# Patient Record
Sex: FEMALE | Race: ASIAN | NOT HISPANIC OR LATINO | URBAN - METROPOLITAN AREA
[De-identification: names, ages, dates, MRNs, and addresses within clinical notes are randomized per-mention and may not be internally consistent; named-entity substitution may affect disease eponyms.]

---

## 2024-03-26 ENCOUNTER — OUTPATIENT (OUTPATIENT)
Dept: OUTPATIENT SERVICES | Facility: HOSPITAL | Age: 19
LOS: 1 days | End: 2024-03-26
Payer: COMMERCIAL

## 2024-03-26 ENCOUNTER — NON-APPOINTMENT (OUTPATIENT)
Age: 19
End: 2024-03-26

## 2024-03-26 ENCOUNTER — APPOINTMENT (OUTPATIENT)
Dept: OBGYN | Facility: CLINIC | Age: 19
End: 2024-03-26

## 2024-03-26 DIAGNOSIS — Z32.00 ENCOUNTER FOR PREGNANCY TEST, RESULT UNKNOWN: ICD-10-CM

## 2024-03-26 PROCEDURE — 81025 URINE PREGNANCY TEST: CPT

## 2024-03-27 ENCOUNTER — NON-APPOINTMENT (OUTPATIENT)
Age: 19
End: 2024-03-27

## 2024-03-29 ENCOUNTER — NON-APPOINTMENT (OUTPATIENT)
Age: 19
End: 2024-03-29

## 2024-03-29 DIAGNOSIS — Z32.01 ENCOUNTER FOR PREGNANCY TEST, RESULT POSITIVE: ICD-10-CM

## 2024-04-01 ENCOUNTER — LABORATORY RESULT (OUTPATIENT)
Age: 19
End: 2024-04-01

## 2024-04-01 ENCOUNTER — OUTPATIENT (OUTPATIENT)
Dept: OUTPATIENT SERVICES | Facility: HOSPITAL | Age: 19
LOS: 1 days | End: 2024-04-01
Payer: COMMERCIAL

## 2024-04-01 ENCOUNTER — APPOINTMENT (OUTPATIENT)
Dept: OBGYN | Facility: CLINIC | Age: 19
End: 2024-04-01
Payer: COMMERCIAL

## 2024-04-01 VITALS
HEIGHT: 61 IN | BODY MASS INDEX: 19.15 KG/M2 | OXYGEN SATURATION: 100 % | WEIGHT: 101.44 LBS | HEART RATE: 76 BPM | DIASTOLIC BLOOD PRESSURE: 68 MMHG | SYSTOLIC BLOOD PRESSURE: 106 MMHG

## 2024-04-01 DIAGNOSIS — Z64.0 PROBLEMS RELATED TO UNWANTED PREGNANCY: ICD-10-CM

## 2024-04-01 DIAGNOSIS — Z00.00 ENCOUNTER FOR GENERAL ADULT MEDICAL EXAMINATION W/OUT ABNORMAL FINDINGS: ICD-10-CM

## 2024-04-01 LAB
ABO + RH PNL BLD: NORMAL
BLD GP AB SCN SERPL QL: NORMAL
HCT VFR BLD CALC: 37 %
HGB BLD-MCNC: 12.5 G/DL
MCHC RBC-ENTMCNC: 29.9 PG
MCHC RBC-ENTMCNC: 33.8 G/DL
MCV RBC AUTO: 88.5 FL
PLATELET # BLD AUTO: 276 K/UL
PMV BLD AUTO: 0 /100 WBCS
PMV BLD: 11.2 FL
RBC # BLD: 4.18 M/UL
RBC # FLD: 13.3 %
WBC # FLD AUTO: 9.52 K/UL

## 2024-04-01 PROCEDURE — 76815 OB US LIMITED FETUS(S): CPT

## 2024-04-01 PROCEDURE — 87389 HIV-1 AG W/HIV-1&-2 AB AG IA: CPT

## 2024-04-01 PROCEDURE — 87591 N.GONORRHOEAE DNA AMP PROB: CPT

## 2024-04-01 PROCEDURE — 36415 COLL VENOUS BLD VENIPUNCTURE: CPT

## 2024-04-01 PROCEDURE — 99214 OFFICE O/P EST MOD 30 MIN: CPT | Mod: GC

## 2024-04-01 PROCEDURE — 76801 OB US < 14 WKS SINGLE FETUS: CPT | Mod: 26

## 2024-04-01 PROCEDURE — 87661 TRICHOMONAS VAGINALIS AMPLIF: CPT

## 2024-04-01 PROCEDURE — 86780 TREPONEMA PALLIDUM: CPT

## 2024-04-01 PROCEDURE — 85027 COMPLETE CBC AUTOMATED: CPT

## 2024-04-01 PROCEDURE — 87491 CHLMYD TRACH DNA AMP PROBE: CPT

## 2024-04-01 PROCEDURE — 86701 HIV-1ANTIBODY: CPT

## 2024-04-01 PROCEDURE — 87340 HEPATITIS B SURFACE AG IA: CPT

## 2024-04-01 PROCEDURE — 76815 OB US LIMITED FETUS(S): CPT | Mod: 26

## 2024-04-01 PROCEDURE — 99215 OFFICE O/P EST HI 40 MIN: CPT

## 2024-04-01 PROCEDURE — 86900 BLOOD TYPING SEROLOGIC ABO: CPT

## 2024-04-01 PROCEDURE — 86850 RBC ANTIBODY SCREEN: CPT

## 2024-04-01 PROCEDURE — 86803 HEPATITIS C AB TEST: CPT

## 2024-04-01 NOTE — PHYSICAL EXAM
[Appropriately responsive] : appropriately responsive [No Acute Distress] : no acute distress [Alert] : alert [Non-tender] : non-tender [Soft] : soft [Oriented x3] : oriented x3 [Non-distended] : non-distended

## 2024-04-02 LAB
C TRACH RRNA SPEC QL NAA+PROBE: NOT DETECTED
HBV SURFACE AG SER QL: NONREACTIVE
HCV AB SER QL: NONREACTIVE
HCV S/CO RATIO: 0.08 S/CO
N GONORRHOEA RRNA SPEC QL NAA+PROBE: NOT DETECTED
SOURCE AMPLIFICATION: NORMAL
SOURCE AMPLIFICATION: NORMAL
T PALLIDUM AB SER QL IA: NEGATIVE
T VAGINALIS RRNA SPEC QL NAA+PROBE: NOT DETECTED

## 2024-04-04 NOTE — H&P PST ADULT - HISTORY OF PRESENT ILLNESS
19yo  @ 16+3w presenting today for scheduled D&E for termination of pregnancy. TAUS on  showed Biometry c/w 15+6, with cardiac activity, anterior placenta, MAHENDRA subjectively normal. No significant medical or surgical hx.     Labs: h/h 12.5/37, O pos

## 2024-04-04 NOTE — H&P PST ADULT - ATTENDING COMMENTS
Patient did not take Doxycycline, will give 200mg Doxycycline pre operatively. Patient tolerated Mifepristone yesterday without issue. Denies HA, CP, SOB, F/C, N/V. Affirmed she if firm in her decision to proceed, she took 600mcg Misoprostol pre operatively Buccally.  T&S sent today.

## 2024-04-04 NOTE — H&P PST ADULT - ASSESSMENT
19yo  @ 16+3w presenting for scheduled D&E for termination of pregnancy.  - Labs (): h/h 12.5/37, O pos. Will order active T&S.  - Cervical prep: mifepristone 200mg day prior & misoprostol 600mg 1.5hrs preoperatively.  - Abx: Doxycyline 200mg PO preop and doxycyline 100mg PO postop in PACU  - Ibuprofen 800mg PO x8hrs for pain   - NPO/IVF  - consent signed (in chart)  - on call to OR 19yo  @ 16+3w presenting for scheduled D&E for termination of pregnancy.  - Labs (): h/h 12.5/37, O pos. Will order active T&S.  - Cervical prep: mifepristone 200mg day prior & misoprostol 600mg 1.5hrs preoperatively.  - Abx: Doxycyline 200mg PO preop and doxycyline 100mg PO postop in PACU  - Ibuprofen 800mg PO x8hrs for pain   - NPO/IVF  - on call to OR

## 2024-04-05 ENCOUNTER — TRANSCRIPTION ENCOUNTER (OUTPATIENT)
Age: 19
End: 2024-04-05

## 2024-04-05 ENCOUNTER — RESULT REVIEW (OUTPATIENT)
Age: 19
End: 2024-04-05

## 2024-04-05 ENCOUNTER — OUTPATIENT (OUTPATIENT)
Dept: OUTPATIENT SERVICES | Facility: HOSPITAL | Age: 19
LOS: 1 days | Discharge: HOME | End: 2024-04-05
Payer: MEDICAID

## 2024-04-05 VITALS
WEIGHT: 100.97 LBS | DIASTOLIC BLOOD PRESSURE: 49 MMHG | OXYGEN SATURATION: 98 % | HEART RATE: 95 BPM | RESPIRATION RATE: 20 BRPM | TEMPERATURE: 99 F | SYSTOLIC BLOOD PRESSURE: 100 MMHG | HEIGHT: 61 IN

## 2024-04-05 VITALS
HEART RATE: 79 BPM | RESPIRATION RATE: 17 BRPM | OXYGEN SATURATION: 100 % | SYSTOLIC BLOOD PRESSURE: 108 MMHG | DIASTOLIC BLOOD PRESSURE: 65 MMHG

## 2024-04-05 DIAGNOSIS — Z64.0 PROBLEMS RELATED TO UNWANTED PREGNANCY: ICD-10-CM

## 2024-04-05 LAB — BLD GP AB SCN SERPL QL: SIGNIFICANT CHANGE UP

## 2024-04-05 PROCEDURE — 88304 TISSUE EXAM BY PATHOLOGIST: CPT

## 2024-04-05 PROCEDURE — 88304 TISSUE EXAM BY PATHOLOGIST: CPT | Mod: 26

## 2024-04-05 PROCEDURE — 86850 RBC ANTIBODY SCREEN: CPT

## 2024-04-05 PROCEDURE — 86900 BLOOD TYPING SEROLOGIC ABO: CPT

## 2024-04-05 PROCEDURE — 86901 BLOOD TYPING SEROLOGIC RH(D): CPT

## 2024-04-05 PROCEDURE — 36415 COLL VENOUS BLD VENIPUNCTURE: CPT

## 2024-04-05 PROCEDURE — C9399: CPT

## 2024-04-05 PROCEDURE — 59841 INDUCED ABORTION DILAT&EVAC: CPT

## 2024-04-05 RX ORDER — ONDANSETRON 8 MG/1
4 TABLET, FILM COATED ORAL ONCE
Refills: 0 | Status: DISCONTINUED | OUTPATIENT
Start: 2024-04-05 | End: 2024-04-05

## 2024-04-05 RX ORDER — SODIUM CHLORIDE 9 MG/ML
1000 INJECTION, SOLUTION INTRAVENOUS
Refills: 0 | Status: DISCONTINUED | OUTPATIENT
Start: 2024-04-05 | End: 2024-04-05

## 2024-04-05 RX ORDER — HYDROMORPHONE HYDROCHLORIDE 2 MG/ML
0.25 INJECTION INTRAMUSCULAR; INTRAVENOUS; SUBCUTANEOUS
Refills: 0 | Status: DISCONTINUED | OUTPATIENT
Start: 2024-04-05 | End: 2024-04-05

## 2024-04-05 RX ADMIN — SODIUM CHLORIDE 100 MILLILITER(S): 9 INJECTION, SOLUTION INTRAVENOUS at 10:50

## 2024-04-05 NOTE — ASU DISCHARGE PLAN (ADULT/PEDIATRIC) - ASU DC SPECIAL INSTRUCTIONSFT
NOTHING IN VAGINA FOR 2 WEEKS    PAIN MANAGEMENT:   Alternate Acetaminophen/Tylenol and Ibuprofen/Motrin (if you are eligible). Each of these medications can be taken every six hours. Try to stagger them so that you are taking something for pain every three hours (ex. Take Motrin at 12:00, Tylenol at 3:00, Motrin at 6:00, etc.) to maximize pain relief.  o Dosages       - Tylenol – 500-650 mg every 6 hours as needed       - Motrin/Ibuprofen - 600 mg every 6 hours as needed  o The maximum dose of Tylenol is 3000 mg in 24 hours, the maximum dose of Motrin/ibuprofen is 2400mg in 24 hours  A warm shower or heating pad may also help.    WHAT TO EXPECT AT HOME  -  Do not put anything in the vagina for at least 2 weeks after surgery unless otherwise instructed by your doctor (including tampons, douching, sexual intercourse, etc).  -  No driving for 1 week after surgery and not while taking narcotic pain medication. Drive defensively when you are ready.  - It is normal to have some vaginal bleeding after surgery that would require the use of a pantiliner.     WHEN TO CALL YOUR DOCTOR:  - Fever (>100.4°F or 38.0°C) or chills  - Severe nausea or persistent vomiting.  - Bright red vaginal bleeding (soaking >1 pad/hour) or foul smelling vaginal drainage.  - Severe pain not relieved with pain medication.  - Pain with urination, cloudy urine, or foul smelling urine.  - Or if you have any other problems or questions.

## 2024-04-05 NOTE — BRIEF OPERATIVE NOTE - NSICDXBRIEFPREOP_GEN_ALL_CORE_FT
PRE-OP DIAGNOSIS:  16 weeks gestation of pregnancy 05-Apr-2024 10:47:44  Natasha Aviles  Unwanted pregnancy 05-Apr-2024 10:47:40  Natasha Aviles

## 2024-04-05 NOTE — ASU DISCHARGE PLAN (ADULT/PEDIATRIC) - CARE PROVIDER_API CALL
Natasha Aviles  Obstetrics and Gynecology  [address] [place] [id number]  Phone: [phone]  Fax: [phone]  Follow Up Time: 2 weeks

## 2024-04-05 NOTE — BRIEF OPERATIVE NOTE - OPERATION/FINDINGS
Cervix small, medium consistency, closed   Dilated to size 63 clements dilator under ultrasound guidance   Size 16 cannula and sopher forceps used to evacuate uterine contents   Size 12 cannula with manual uterine aspiration performed until gritty texture noted throughout   Cervix inspected and 9 oclock noted to have 1cm cervical laceration, repaired with 2-0 Vicryl in running locked fashion with good hemostasis   Monsels solution applied to surface of cervix   Good hemostasis and good uterine tone post procedure

## 2024-04-05 NOTE — CHART NOTE - NSCHARTNOTEFT_GEN_A_CORE
PACU ANESTHESIA ADMISSION NOTE      Procedure: Induced  by dilation and evacuation      Post op diagnosis:  Unwanted pregnancy        ____  Intubated  TV:______       Rate: ______      FiO2: ______    _x___  Patent Airway    _x___  Full return of protective reflexes    __x__  Full recovery from anesthesia / back to baseline     Vitals:   T: 97.7          R:  13                BP: 90/69                  Sat:    100               P: 100      Mental Status:  ___x_ Awake   _____ Alert   _____ Drowsy   _____ Sedated    Nausea/Vomiting:  __x__ NO  ______Yes,   See Post - Op Orders          Pain Scale (0-10):  _____    Treatment: ____ None    _x___ See Post - Op/PCA Orders    Post - Operative Fluids:   ____ Oral   _x___ See Post - Op Orders    Plan: Discharge:   __x__Home       _____Floor     _____Critical Care    _____  Other:_________________    Comments:

## 2024-04-10 DIAGNOSIS — Z64.0 PROBLEMS RELATED TO UNWANTED PREGNANCY: ICD-10-CM

## 2024-04-10 DIAGNOSIS — O34.40 MATERNAL CARE FOR OTHER ABNORMALITIES OF CERVIX, UNSPECIFIED TRIMESTER: ICD-10-CM

## 2024-04-10 DIAGNOSIS — N88.1 OLD LACERATION OF CERVIX UTERI: ICD-10-CM

## 2024-04-11 LAB — SURGICAL PATHOLOGY STUDY: SIGNIFICANT CHANGE UP

## 2024-04-16 ENCOUNTER — APPOINTMENT (OUTPATIENT)
Dept: OBGYN | Facility: CLINIC | Age: 19
End: 2024-04-16
Payer: MEDICAID

## 2024-04-16 ENCOUNTER — OUTPATIENT (OUTPATIENT)
Dept: OUTPATIENT SERVICES | Facility: HOSPITAL | Age: 19
LOS: 1 days | End: 2024-04-16
Payer: MEDICAID

## 2024-04-16 VITALS
BODY MASS INDEX: 18.21 KG/M2 | SYSTOLIC BLOOD PRESSURE: 107 MMHG | HEART RATE: 70 BPM | OXYGEN SATURATION: 98 % | HEIGHT: 61 IN | WEIGHT: 96.44 LBS | DIASTOLIC BLOOD PRESSURE: 75 MMHG

## 2024-04-16 DIAGNOSIS — O03.9 COMPLETE OR UNSPECIFIED SPONTANEOUS ABORTION WITHOUT COMPLICATION: ICD-10-CM

## 2024-04-16 PROBLEM — Z78.9 OTHER SPECIFIED HEALTH STATUS: Chronic | Status: ACTIVE | Noted: 2024-04-04

## 2024-04-16 PROCEDURE — 99213 OFFICE O/P EST LOW 20 MIN: CPT

## 2024-04-16 RX ORDER — ULIPRISTAL ACETATE 30 MG/1
30 TABLET ORAL
Qty: 1 | Refills: 0 | Status: ACTIVE | COMMUNITY
Start: 2024-04-16 | End: 1900-01-01

## 2024-04-16 NOTE — PLAN
[FreeTextEntry1] : TAUS: FL: 1.88cm, BPD: 3.15cm, Biometry c/w 15+6, with cardiac activity, anterior placenta, MAHENDRA subjectively normal   A/P: 17yo   @ 15+6 presenting today for procedural management of undesired pregnancy.   Options Counseling: The patient was counseled about her pregnancy options of parenthood, adoption and . She expressed her firm decision for pregnancy termination.   Risks and benefits of the procedure were explained, including risk of bleeding, infection, uterine perforation and damage to nearby structures, cervical laceration, retained products of conception and need for re-aspiration.   All questions answered to the patient and partner's satisfaction.   T&S, CBC drawn today will give Rhogam if Rh Negative   STI screening offered and accepted: Self swab and serology sent today      Mifepristone and Misoprostol will be provided for cervical prep. Mifepristone dispensed today with instructions to take the day before procedure. Will administer 600mcg Misoprostol at FERNY day of procedure.   Doxycycline 200mg PO dispensed today with instructions for patient to take the night before procedure   We reviewed preop and postoperative instructions with her, including NPO instructions.   Contraception and Future Pregnancy Planning: Patient offered options counseling declines initiating a method at this time

## 2024-04-16 NOTE — HISTORY OF PRESENT ILLNESS
[FreeTextEntry1] : Patient presenting today for termination of pregnancy. Patient with irregular menses, LMP 11/2023.  She has not told her family about the procedure, has told her partner who is here for support.   ObHx: G1 GynHx: Denies cysts, denies fibroids, denies STIs  PMH: Denies hospitalizations, denies PMH, denies transfusions or history of heavy bleeding   PSH: Denies   Meds: Denies  Allergies: NKDA

## 2024-04-29 DIAGNOSIS — Z98.890 OTHER SPECIFIED POSTPROCEDURAL STATES: ICD-10-CM

## 2024-04-29 DIAGNOSIS — Z30.09 ENCOUNTER FOR OTHER GENERAL COUNSELING AND ADVICE ON CONTRACEPTION: ICD-10-CM

## 2024-05-18 NOTE — HISTORY OF PRESENT ILLNESS
[FreeTextEntry1] : Patient presenting today after termination of pregnancy. She is presenting today with her boyfriend for support. She is presenting today feeling well, has not yet resumed intercourse. She reports she is recovering ok, coping well.   She reports her bleeding is minimal, denies cramping. Denies HA, CP, SOB, F/C, N/V. Declines method of avoiding pregnancy, after counseling patient considering Nexplanon.

## 2024-05-18 NOTE — PLAN
[FreeTextEntry1] : 19yo  s/p termination of pregnancy, recovering well.   Patient desires to avoid pregnancy, patient continuing to consider her options. Discussed hormonal and nonhormonal methods.  Emergency contraception education provided, and patient desires Hoa for back emergency contraception - Rx for Hoa provided   RTC for annual exam and ongoing conception education or sooner as needed

## 2024-05-22 ENCOUNTER — NON-APPOINTMENT (OUTPATIENT)
Age: 19
End: 2024-05-22

## 2024-05-22 RX ORDER — NORELGESTROMIN AND ETHINYL ESTRADIOL 150; 35 UG/D; UG/D
150-35 PATCH TRANSDERMAL
Qty: 3 | Refills: 12 | Status: ACTIVE | COMMUNITY
Start: 2024-05-22 | End: 1900-01-01

## 2024-05-22 RX ORDER — LEVONORGESTREL 1.5 MG/1
1.5 TABLET ORAL
Qty: 1 | Refills: 0 | Status: ACTIVE | COMMUNITY
Start: 2024-05-22 | End: 1900-01-01

## 2024-07-16 ENCOUNTER — APPOINTMENT (OUTPATIENT)
Dept: OBGYN | Facility: CLINIC | Age: 19
End: 2024-07-16

## 2024-09-12 ENCOUNTER — RX RENEWAL (OUTPATIENT)
Age: 19
End: 2024-09-12

## 2024-09-12 RX ORDER — LEVONORGESTREL 1.5 MG/1
1.5 TABLET ORAL
Qty: 1 | Refills: 0 | Status: ACTIVE | COMMUNITY
Start: 2024-09-12 | End: 1900-01-01

## 2024-09-20 ENCOUNTER — NON-APPOINTMENT (OUTPATIENT)
Age: 19
End: 2024-09-20

## 2024-10-22 ENCOUNTER — RX RENEWAL (OUTPATIENT)
Age: 19
End: 2024-10-22

## 2024-12-27 ENCOUNTER — RX RENEWAL (OUTPATIENT)
Age: 19
End: 2024-12-27

## 2025-01-27 ENCOUNTER — RX RENEWAL (OUTPATIENT)
Age: 20
End: 2025-01-27

## 2025-06-23 ENCOUNTER — RX RENEWAL (OUTPATIENT)
Age: 20
End: 2025-06-23